# Patient Record
Sex: MALE | Race: WHITE | NOT HISPANIC OR LATINO
[De-identification: names, ages, dates, MRNs, and addresses within clinical notes are randomized per-mention and may not be internally consistent; named-entity substitution may affect disease eponyms.]

---

## 2024-07-22 PROBLEM — Z00.00 ENCOUNTER FOR PREVENTIVE HEALTH EXAMINATION: Status: ACTIVE | Noted: 2024-07-22

## 2024-07-31 ENCOUNTER — NON-APPOINTMENT (OUTPATIENT)
Age: 64
End: 2024-07-31

## 2024-07-31 ENCOUNTER — APPOINTMENT (OUTPATIENT)
Dept: OTOLARYNGOLOGY | Facility: CLINIC | Age: 64
End: 2024-07-31
Payer: COMMERCIAL

## 2024-07-31 VITALS
BODY MASS INDEX: 40.29 KG/M2 | HEIGHT: 69 IN | WEIGHT: 272 LBS | DIASTOLIC BLOOD PRESSURE: 87 MMHG | SYSTOLIC BLOOD PRESSURE: 154 MMHG | HEART RATE: 61 BPM

## 2024-07-31 DIAGNOSIS — R49.0 DYSPHONIA: ICD-10-CM

## 2024-07-31 DIAGNOSIS — K21.9 GASTRO-ESOPHAGEAL REFLUX DISEASE W/OUT ESOPHAGITIS: ICD-10-CM

## 2024-07-31 DIAGNOSIS — J34.1 CYST AND MUCOCELE OF NOSE AND NASAL SINUS: ICD-10-CM

## 2024-07-31 PROCEDURE — 99204 OFFICE O/P NEW MOD 45 MIN: CPT | Mod: 25

## 2024-07-31 PROCEDURE — 31231 NASAL ENDOSCOPY DX: CPT

## 2024-07-31 RX ORDER — CALCIUM CARBONATE 600 MG
600 TABLET,CHEWABLE ORAL DAILY
Qty: 30 | Refills: 2 | Status: ACTIVE | COMMUNITY
Start: 2024-07-31 | End: 1900-01-01

## 2024-07-31 RX ORDER — SODIUM CHLORIDE 2.65%
2.65 AEROSOL, SPRAY (ML) NASAL TWICE DAILY
Qty: 3 | Refills: 0 | Status: ACTIVE | COMMUNITY
Start: 2024-07-31 | End: 1900-01-01

## 2024-07-31 NOTE — PROCEDURE
[FreeTextEntry6] : Nasal Endoscopy: Reason for nasal endoscopy: anterior rhinoscopy insufficient to account for symptoms.   Flexible scope #25 was used. Right nasal passage with normal inferior, middle and superior turbinates. Nasal passage patent with clear widely patent max antrostomy. Polypoid mucosa diffusely and minimal crusting noted posteriorly. Clear sphenoethmoid recess. Left nasal passage with normal inferior, middle and superior turbinates. Nasal passage was patent with clear middle meatus and sphenoethmoid recess. No mucopurulence appreciated. Nasopharynx clear.      [de-identified] : Procedure performed: laryngeal Endoscopy- Diagnostic Indication: Globus, throat discomfort. Verbal and/or written consent obtained from patient, parent, and/or guardian Scope #: 25, flexible fiber optic telescope used. Scope was introduced through the nose passed on the floor of the nose to the nasopharynx and then followed down the soft palate to the lower pharynx. The tongue Base, Larynx, Hypopharynx were examined. Base of tongue was symmetric, vallecular was clear, epiglottis was not deformed, subglottis/ pyriform and posterior pharyngeal walls were clear. No erythema, edema, pooling of secretions, masses or lesions. Airway patent, no foreign body visualized. No glottic/supraglottic edema. True vocal cords, arytenoids, vestibular folds, ventricles, pyriform sinuses, and aryepiglottic folds are erythematous and edematous bilaterally. Vocal cords mobile with good contact b/l.

## 2024-07-31 NOTE — HISTORY OF PRESENT ILLNESS
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  PET-CT showed an incidental finding of right maxillary sinus retention cyst.   Notes no nasal congestion or sinus pressure.  No recurrent sinusitis.  Not using any nasal sprays.   Had some intermittent raspy voice last month.  now voice is good.  No trouble swallowing.  Hx of VC polyps.   Pos GERD - being treated with GI.

## 2024-07-31 NOTE — END OF VISIT
[FreeTextEntry3] : I personally saw and examined AVILA MARINO in detail.I have made changes in changes in the body of the note where appropriate. I personally reviewed the HPI, PMH, FH, SH, ROS and medications/allergies. I have spoken to REDD Castellanos regarding the history and have personally determined the assessment and plan of care and documented this myself.. I performed all procedures and performed the physical exam. I have made changes in the body of the note where appropriate.   Attesting Faculty: See Attending Signature Below    [Time Spent: ___ minutes] : I have spent [unfilled] minutes of time on the encounter.

## 2024-07-31 NOTE — ASSESSMENT
[FreeTextEntry1] : Incidental finding of right maxillary retention cyst on PET-CT.  Asymptomatic with regard to sinuses: - Reassured this is a benign finding, no need for intervention. - Can use Nasal saline for moisturization.  Raspy voice and Hx of Vocal cord polyps, now with LPRD: - reflux precaution handout given and reviewed with patient - Maalox.  - Continue to f/u with GI

## 2025-04-09 ENCOUNTER — APPOINTMENT (OUTPATIENT)
Dept: OTOLARYNGOLOGY | Facility: CLINIC | Age: 65
End: 2025-04-09